# Patient Record
Sex: MALE | Race: WHITE
[De-identification: names, ages, dates, MRNs, and addresses within clinical notes are randomized per-mention and may not be internally consistent; named-entity substitution may affect disease eponyms.]

---

## 2022-07-09 ENCOUNTER — HOSPITAL ENCOUNTER (EMERGENCY)
Dept: HOSPITAL 33 - ED | Age: 62
Discharge: HOME | End: 2022-07-09
Payer: MEDICARE

## 2022-07-09 VITALS — DIASTOLIC BLOOD PRESSURE: 73 MMHG | HEART RATE: 76 BPM | SYSTOLIC BLOOD PRESSURE: 129 MMHG

## 2022-07-09 VITALS — OXYGEN SATURATION: 95 %

## 2022-07-09 DIAGNOSIS — Z72.0: ICD-10-CM

## 2022-07-09 DIAGNOSIS — E11.22: ICD-10-CM

## 2022-07-09 DIAGNOSIS — J44.9: ICD-10-CM

## 2022-07-09 DIAGNOSIS — M25.532: Primary | ICD-10-CM

## 2022-07-09 DIAGNOSIS — Z79.899: ICD-10-CM

## 2022-07-09 DIAGNOSIS — N18.9: ICD-10-CM

## 2022-07-09 PROCEDURE — 73110 X-RAY EXAM OF WRIST: CPT

## 2022-07-09 PROCEDURE — 99283 EMERGENCY DEPT VISIT LOW MDM: CPT

## 2022-07-09 NOTE — ERPHSYRPT
- History of Present Illness


Time Seen by Provider: 07/09/22 20:40


Source: patient


Exam Limitations: no limitations


Patient Subjective Stated Complaint: pt states he has left wrist pain, and is 

not aware of ever injuring the wrist. states the painbegan today, states he fell

out of his chair several times, states he could have hurt his wrist at that 

time. States he also hit his head at that time.


Triage Nursing Assessment: pt is alert and oriented, states pain in left wrist 

is 10/10. wrist does not appear swollen, but pt is gaurding wrist and states it 

hurts more if palpated.


Physician History: 





The patient is a 61-year-old male with past medical significant for COPD, 

ongoing cigarette smoking in which he smokes about a pack a day, diabetes 

mellitus, CKD who presents with a chief complaint of left wrist pain.


Of note, the patient is right-hand dominant.


He apparently started to experience left wrist pain today that has been constant

and gradually getting worse.


He states he has fallen out of a chair recently after falling asleep and may 

have injured his wrist during that time.


He is also had what was described to be a possible left cubital tunnel release a

year ago.


The pain is a throbbing pain that is constant and located to the left wrist.


He denies any additional injuries, fever, chills and has not take anything for 

pain prior to arrival.


He arrives accompanied by his mother.


The patient is retired and worked for lawn maintenance for living prior to 

long-term.


Allergies/Adverse Reactions: 








Penicillins Allergy (Severe, Verified 07/09/22 20:42)


   Swelling


ondansetron [From Zofran] Allergy (Intermediate, Verified 07/09/22 20:42)


   Headache


tramadol Adverse Reaction (Severe, Verified 07/09/22 20:42)


   hallucinate


codeine Adverse Reaction (Mild, Verified 07/09/22 20:42)


   abdominal pain





Home Medications: 








Atorvastatin Calcium 80 mg PO DAILY 06/25/19 [History]


Ergocalciferol (Vitamin D2) [Vitamin D] 50,000 unit PO WEEKLY 06/25/19 [History]


Gabapentin 400 mg PO HS 06/25/19 [History]


Linagliptin [Tradjenta] 5 mg PO DAILY 06/25/19 [History]


PANTOPRAZOLE 40 mg Tablet*** [Protonix 40MG Tablet***] 40 mg PO DAILY 06/25/19 

[History]


carvediloL [Carvedilol] 12.5 mg PO DAILY 06/25/19 [History]


Bumetanide 1 mg*** [Bumex 1 mg***] 1 mg PO DAILY 01/01/20 [History]


Cyclobenzaprine HCl [Flexeril] 10 mg PO TID 01/01/20 [History]


Empagliflozin [Jardiance] 10 mg PO DAILY 01/01/20 [History]


Linaclotide [Linzess] 72 mcg PO DAILY 01/01/20 [History]


Nitroglycerin 0.4 mg Tablet*** [Nitrostat 0.4 MG Tablet***] 0.4 mg SL UD 

01/01/20 [History]


Amlodipine Besylate 5 mg*** [Norvasc 5 mg***] 5 mg PO DAILY 08/20/21 [History]


Budesonide/Formoterol Fumarate [Budesonide-Formoterol 160-4.5] 2 puff PO BID 

08/20/21 [History]


Dulaglutide [Trulicity] 0.75 mg SQ WEEKLY 08/20/21 [History]


Evolocumab [Repatha Syringe] 140 mg IM WEEKLY 08/20/21 [History]


Famotidine 20 mg*** [Pepcid 20 MG***] 20 mg PO DAILY 08/20/21 [History]


Lipase/Protease/Amylase [Creon Dr 24,000 Unit Capsule] 2 cap PO TID 08/20/21 

[History]


Tiotropium Bromide Inhaler*** [Spiriva 18 Mcg/Cap Inhaler***] 18 mcg PO DAILY 

08/20/21 [History]





Hx Tetanus, Diphtheria Vaccination/Date Given: Yes


Hx Influenza Vaccination/Date Given: Yes


Hx Pneumococcal Vaccination/Date Given: Yes





Travel Risk





- International Travel


Have you traveled outside of the country in past 3 weeks: No





- Coronavirus Screening


Are you exhibiting any of the following symptoms?: No


Close contact with a COVID-19 positive Pt in past 14-21 Days: No





- Vaccine Status


Have you recieved a Covid-19 vaccination: Yes


: Pfizer





- Vaccination Dates


Date of 2cond Vaccination (if applicable): unknown





- Review of Systems


Constitutional: No Fever, No Chills


Respiratory: No Cough


Musculoskeletal: Fall, Other (Left wrist pain)


All Other Systems: Reviewed and Negative





- Past Medical History


Pertinent Past Medical History: Yes


Neurological History: No Pertinent History


ENT History: Cataracts


Cardiac History: Angina, Coronary Artery Disease, Other


Respiratory History: COPD


Endocrine Medical History: Diabetes Type II


Musculoskeletal History: No Pertinent History


GI Medical History: GERD


 History: No Pertinent History


Psycho-Social History: Anxiety, Depression


Male Reproductive Disorders: No Pertinent History


Other Medical History: 3 STENTS TO HEART AND RIGHT LEG, HAS HAD 2 HEART ATTACKS 

THAT WERE 2 YEARS APART





- Past Surgical History


Past Surgical History: Yes


Cardiac: Cardiac Catheterization, Cardiac Stent


Gastrointestinal: Cholecystectomy, Hernia Repair


Other Surgical History: windy.  hernia.  cardiac stents.  left elbow.  right 

elbow





- Social History


Smoking Status: Current every day smoker


How long have you smoked: 40 years


Exposure to second hand smoke: Yes


Drug Use: none


Patient Lives Alone: No


Significant Family History: no pertinent family hx





- Nursing Vital Signs


Nursing Vital Signs: 


                               Initial Vital Signs











Temperature  98.6 F   07/09/22 20:32


 


Respiratory Rate  18   07/09/22 20:32


 


O2 Sat by Pulse Oximetry  95   07/09/22 20:32








                                   Pain Scale











Pain Intensity                 3

















- Physical Exam


General Appearance: no apparent distress, alert


Eye Exam: No scleral icterus


Respiratory Exam: airway intact, diminished breath sounds, wheezing, No chest 

tenderness, No respiratory distress


Cardiovascular Exam: regular rate/rhythm, murmur, capillary refill 2-3 sec, 

other (Left radial pulse 2+, capillary refill brisk in all fingers of the left 

hand.)


Extremity Exam: tenderness, other (Patient had tenderness noted to the left 

wrist with no deformity crepitus and he had pain with passive and active motion 

of the left wrist in addition to the left fingers.  He did have some tenderness 

upon palpation of the left cubital tunnel however his elbow seem to be intact.  

Compartments soft)


Neurologic Exam: alert, oriented x 3


Skin Exam: normal color, warm, dry, No rash


SpO2: 95


O2 Delivery: Room Air





- Course


Nursing assessment & vital signs reviewed: Yes





- Radiology Exams


  ** Wrist


X-ray Interpretation: Interpreted by me, Reviewed by me, Negative, No Fracture


Ordered Tests: 


                               Active Orders 24 hr











 Category Date Time Status


 


 Ace Bandage Application -[facility] STAT Care  07/09/22 21:42 Completed


 


 Re-Check Vital Signs STAT Care  07/09/22 21:49 Completed


 


 WRIST (MIN 3 VIEWS) Stat Exams  07/09/22 20:55 Completed








Medication Summary














Discontinued Medications














Generic Name Dose Route Start Last Admin





  Trade Name Sonam  PRN Reason Stop Dose Admin


 


Hydrocodone Bitart/Acetaminophen  2 tab  07/09/22 21:00  07/09/22 21:06





  Hydrocodone/Apap 5/325 Mg Tablet  PO  07/09/22 21:01  2 tab





  STAT ONE   Administration


 


Hydrocodone Bitart/Acetaminophen  Confirm  07/09/22 21:03 





  Hydrocodone/Apap 5/325 Mg Tablet  Administered  07/09/22 21:04 





  Dose  





  2 tab  





  .ROUTE  





  .STK-MED ONE  














- Progress


Progress: improved


Progress Note: 





07/09/22 20:58


Inspect reviewed and appears the patient was prescribed 30 mg tablets of 

temazepam for 30-day supply that was filled on July 1, 2022.


Had also.  She is prescribed gabapentin 400 mg tablets that was filled on June 20, 2022.


07/09/22 21:22


Wrist x-ray reviewed and showed no evidence of acute fracture or dislocation.  

Currently waiting formal radiology review.


07/10/22 02:50


The patient presents with left wrist pain could be secondary to a fall and may 

be secondary to a sprain.  There is no evidence of fracture or dislocation.  I 

do not see any evidence of cellulitis and the joint itself is not warm in 

comparison to the right wrist.  There is no induration or fluctuance.  My 

suspicion for septic arthritis of the wrist, inflammatory joint disease is low 

at this time.  I provided the patient an Ace bandage for comfort and instructed 

to take Tylenol as needed for pain.  He was cautioned to return to the emergency

 department if you are developing fever, chills, increased redness swelling or 

increased warmth to the affected joint for further evaluation and management as 

needed.  Otherwise, I recommend he follow-up with his PCP for further 

evaluation.  He agreed with and verbally understood the discharge plan and was 

comfortable with being discharged home.


Counseled pt/family regarding: diagnosis, need for follow-up, rad results





- Departure


Departure Disposition: Home


Clinical Impression: 


 Wrist pain, left





Condition: Stable


Critical Care Time: No


Referrals: 


PETRA APODACA MD [Primary Care Provider] - Follow up/PCP as directed


Instructions:  Acetaminophen


Additional Instructions: 


Please take Tylenol as needed for pain.  You can purchase this medication 

over-the-counter.  Please take this medication as instructed on the medication 

bottle.





Please return to the emergency department for further evaluation if you notice 

any increased redness, swelling or fever associated with your pain.

## 2022-07-09 NOTE — XRAY
Indication: Pain.  No known injury.



Comparison: None



3 view left wrist demonstrates mild radiocarpal degenerative joint space

narrowing and diffuse scattered vascular calcifications.  No other bony,

articular, or soft tissue abnormalities.

## 2022-11-30 ENCOUNTER — HOSPITAL ENCOUNTER (EMERGENCY)
Dept: HOSPITAL 33 - ED | Age: 62
Discharge: HOME | End: 2022-11-30
Payer: MEDICARE

## 2022-11-30 VITALS — HEART RATE: 71 BPM | DIASTOLIC BLOOD PRESSURE: 66 MMHG | OXYGEN SATURATION: 94 % | SYSTOLIC BLOOD PRESSURE: 98 MMHG

## 2022-11-30 DIAGNOSIS — Z99.81: ICD-10-CM

## 2022-11-30 DIAGNOSIS — J44.9: ICD-10-CM

## 2022-11-30 DIAGNOSIS — E11.9: ICD-10-CM

## 2022-11-30 DIAGNOSIS — R05.1: ICD-10-CM

## 2022-11-30 DIAGNOSIS — J10.1: Primary | ICD-10-CM

## 2022-11-30 DIAGNOSIS — E78.5: ICD-10-CM

## 2022-11-30 DIAGNOSIS — Z79.84: ICD-10-CM

## 2022-11-30 DIAGNOSIS — Z79.899: ICD-10-CM

## 2022-11-30 DIAGNOSIS — M79.10: ICD-10-CM

## 2022-11-30 DIAGNOSIS — Z79.891: ICD-10-CM

## 2022-11-30 DIAGNOSIS — Z79.85: ICD-10-CM

## 2022-11-30 DIAGNOSIS — Z79.52: ICD-10-CM

## 2022-11-30 DIAGNOSIS — I10: ICD-10-CM

## 2022-11-30 LAB
FLUAV AG NPH QL IA: POSITIVE
FLUBV AG NPH QL IA: NEGATIVE
RSV AG SPEC QL IA: NEGATIVE
SARS-COV-2 AG RESP QL IA.RAPID: NEGATIVE

## 2022-11-30 PROCEDURE — 71045 X-RAY EXAM CHEST 1 VIEW: CPT

## 2022-11-30 PROCEDURE — 0241U: CPT

## 2022-11-30 PROCEDURE — 96372 THER/PROPH/DIAG INJ SC/IM: CPT

## 2022-11-30 PROCEDURE — 94640 AIRWAY INHALATION TREATMENT: CPT

## 2022-11-30 PROCEDURE — 99283 EMERGENCY DEPT VISIT LOW MDM: CPT

## 2022-11-30 NOTE — ERPHSYRPT
- History of Present Illness


Time Seen by Provider: 11/30/22 10:45


Source: patient


Exam Limitations: no limitations


Patient Subjective Stated Complaint: Weakness- Influenza A +


Triage Nursing Assessment: Patient ambulated back to ED and transferred self to 

bed. Patient A+O X 3. Patient's skin pink, warm and dry. Patient complains of 

weakness since yesterday. Patient dx with Influenza A on Monday. Patient noted 

to have O2 sat 86% on room air, but wears home O2 and didn't have with him. O2 2

liters applied with Sat to 98%. Patient complains of body aches 8/10. Patient 

complains of fever, cough, occasional SOB, and bodyaches since Sunday.


Physician History: 





This is a 61-year-old white male daily smoker of cigarettes with a history of 

hypertension, COPD, gastroesophageal reflux disease and hyperlipidemia who wears

2 L of oxygen via nasal cannula at home and was diagnosed 2 days ago with 

influenza A.  In the last 48 hours, he states that he aches all over, has 

intermittent coughing without nausea vomiting diarrhea or abdominal pain.  He 

has no chest pain.  He was placed on a Z-Miguel 2 days ago and continues to take 

that medication.  He has albuterol inhalers and nebulizer medication but has not

been consistent in taking those medications recently.  He says that he stopped 

smoking 2 days ago.  Patient has taken Norco/hydrocodone in the past without any

negative effects


Timing/Duration: day(s) (A few days), worse


Cough Quality/Degree: mild (To moderate and intermittent), dry cough


Possible Cause: occasional episodes


Modifying Factors: Improves With: coughing


Associated Symptoms: cough, muscle aches, shortness of breath, wheezing, No 

chest pain/soreness


Allergies/Adverse Reactions: 








Penicillins Allergy (Severe, Verified 11/30/22 10:45)


   Swelling


ondansetron [From Zofran] Allergy (Intermediate, Verified 11/30/22 10:45)


   Headache


tramadol Adverse Reaction (Severe, Verified 11/30/22 10:45)


   hallucinate


codeine Adverse Reaction (Mild, Verified 11/30/22 10:45)


   abdominal pain





Home Medications: 








Atorvastatin Calcium 80 mg PO DAILY 06/25/19 [History]


Ergocalciferol (Vitamin D2) [Vitamin D] 50,000 unit PO WEEKLY 06/25/19 [History]


Gabapentin 400 mg PO HS 06/25/19 [History]


Linagliptin [Tradjenta] 5 mg PO DAILY 06/25/19 [History]


PANTOPRAZOLE 40 mg Tablet*** [Protonix 40MG Tablet***] 40 mg PO DAILY 06/25/19 

[History]


carvediloL [Carvedilol] 12.5 mg PO DAILY 06/25/19 [History]


Bumetanide 1 mg*** [Bumex 1 mg***] 1 mg PO DAILY 01/01/20 [History]


Cyclobenzaprine HCl [Flexeril] 10 mg PO TID 01/01/20 [History]


Empagliflozin [Jardiance] 10 mg PO DAILY 01/01/20 [History]


Linaclotide [Linzess] 72 mcg PO DAILY 01/01/20 [History]


Nitroglycerin 0.4 mg Tablet*** [Nitrostat 0.4 MG Tablet***] 0.4 mg SL UD 

01/01/20 [History]


Amlodipine Besylate 5 mg*** [Norvasc 5 mg***] 5 mg PO DAILY 08/20/21 [History]


Budesonide/Formoterol Fumarate [Budesonide-Formoterol 160-4.5] 2 puff PO BID 

08/20/21 [History]


Dulaglutide [Trulicity] 0.75 mg SQ WEEKLY 08/20/21 [History]


Evolocumab [Repatha Syringe] 140 mg IM WEEKLY 08/20/21 [History]


Famotidine 20 mg*** [Pepcid 20 MG***] 20 mg PO DAILY 08/20/21 [History]


Lipase/Protease/Amylase [Creon Dr 24,000 Unit Capsule] 2 cap PO TID 08/20/21 

[History]


Tiotropium Bromide Inhaler*** [Spiriva 18 Mcg/Cap Inhaler***] 18 mcg PO DAILY 

08/20/21 [History]





Hx Tetanus, Diphtheria Vaccination/Date Given: Yes


Hx Influenza Vaccination/Date Given: Yes


Hx Pneumococcal Vaccination/Date Given: Yes


Immunizations Up to Date: Yes





Travel Risk





- International Travel


Have you traveled outside of the country in past 3 weeks: No





- Coronavirus Screening


Are you exhibiting any of the following symptoms?: Yes


Symptoms: Fever, Cough: New Onset, Shortness of Breath, Headaches/Body 

Aches/Fatigue


Close contact with a COVID-19 positive Pt in past 14-21 Days: No





- Vaccine Status


Have you recieved a Covid-19 vaccination: Yes


: Pfizer





- Vaccination Dates


Date of 2cond Vaccination (if applicable): unknown





- Review of Systems


Constitutional: No Symptoms


Eyes: No Symptoms


Ears, Nose, & Throat: No Symptoms


Respiratory: Cough, Wheezing


Cardiac: No Symptoms


Abdominal/Gastrointestinal: No Symptoms


Genitourinary Symptoms: No Symptoms


Musculoskeletal: Arthralgias, Myalgias


Skin: No Symptoms


Neurological: No Symptoms


Psychological: No Symptoms


Endocrine: No Symptoms


Hematologic/Lymphatic: No Symptoms


Immunological/Allergic: No Symptoms


All Other Systems: Reviewed and Negative





- Past Medical History


Pertinent Past Medical History: Yes


Neurological History: No Pertinent History


ENT History: Cataracts


Cardiac History: Angina, Coronary Artery Disease, Other


Respiratory History: COPD


Endocrine Medical History: Diabetes Type II


Musculoskeletal History: No Pertinent History


GI Medical History: GERD


 History: No Pertinent History


Psycho-Social History: Anxiety, Depression


Male Reproductive Disorders: No Pertinent History


Other Medical History: 3 STENTS TO HEART AND RIGHT LEG, HAS HAD 2 HEART ATTACKS 

THAT WERE 2 YEARS APART





- Past Surgical History


Past Surgical History: Yes


Cardiac: Cardiac Catheterization, Cardiac Stent


Gastrointestinal: Cholecystectomy, Hernia Repair


Other Surgical History: windy.  hernia.  cardiac stents.  left elbow.  right 

elbow





- Social History


Smoking Status: Former smoker


How long have you smoked: 40 years


Exposure to second hand smoke: Yes


Drug Use: none


Patient Lives Alone: No


Significant Family History: no pertinent family hx





- Nursing Vital Signs


Nursing Vital Signs: 


                               Initial Vital Signs











Temperature  97.9 F   11/30/22 10:39


 


Pulse Rate  81   11/30/22 10:39


 


Respiratory Rate  26 H  11/30/22 10:39


 


Blood Pressure  155/78   11/30/22 10:39


 


O2 Sat by Pulse Oximetry  86 L  11/30/22 10:39








                                   Pain Scale











Pain Intensity                 6

















- Physical Exam


General Appearance: no apparent distress, alert, anxiety


Eye Exam: PERRL/EOMI, eyes nml inspection


Ears, Nose, Throat Exam: normal ENT inspection, moist mucous membranes


Neck Exam: normal inspection, non-tender, supple, full range of motion


Respiratory Exam: wheezing, No chest tenderness, No respiratory distress


Cardiovascular Exam: regular rate/rhythm, normal heart sounds, normal peripheral

 pulses


Gastrointestinal/Abdomen Exam: soft, normal bowel sounds, No tenderness


Rectal Exam: not done


Back Exam: normal inspection, normal range of motion, No CVA tenderness, No 

vertebral tenderness


Extremity Exam: normal inspection, normal range of motion, pelvis stable


Neurologic Exam: alert, oriented x 3, cooperative, CNs II-XII nml as tested, 

normal mood/affect, nml cerebellar function, nml station & gait, sensation nml


Skin Exam: normal color, warm, dry


Lymphatic Exam: No adenopathy


**SpO2 Interpretation**: hypoxic


SpO2: 86 (Patient arrived without his oxygen.  Once we placed him on his usual 2

 L oxygen nasal cannula his oxygenation saturation increased to 98%)


O2 Delivery: Room Air





- Course


Nursing assessment & vital signs reviewed: Yes


Ordered Tests: 


                               Active Orders 24 hr











 Category Date Time Status


 


 CHEST 1 VIEW (PORTABLE) Stat Exams  11/30/22 10:49 Ordered


 


 Respiratory Therapy Assessment DAILY RT  11/30/22 11:16 Active








Medication Summary














Discontinued Medications














Generic Name Dose Route Start Last Admin





  Trade Name Freq  PRN Reason Stop Dose Admin


 


Hydrocodone Bitart/Acetaminophen  10 ml  11/30/22 10:56  11/30/22 11:23





  Hydrocodone/Acetaminophen 5 Ml Udcup  PO  11/30/22 10:57  10 ml





  STAT STA   Administration


 


Hydrocodone Bitart/Acetaminophen  Confirm  11/30/22 10:59 





  Hydrocodone/Acetaminophen 5 Ml Udcup  Administered  11/30/22 11:00 





  Dose  





  10 ml  





  .ROUTE  





  .STK-MED ONE  


 


Albuterol/Ipratropium  Confirm  11/30/22 11:06 





  Ipratropium/Albuterol Sulfate 3 Ml Ampul.Neb  Administered  11/30/22 11:07 





  Dose  





  3 ml  





  IH  





  .STK-MED ONE  


 


Albuterol/Ipratropium  3 ml  11/30/22 11:15  11/30/22 11:16





  Ipratropium/Albuterol Sulfate 3 Ml Ampul.Neb  IH  11/30/22 11:16  3 ml





  STAT ONE   Administration


 


Methylprednisolone Sodium  0 mg  11/30/22 10:56  11/30/22 11:20





Succinate 125 mg/ Sterile  IV  11/30/22 10:57  Not Given





Water 2 ml  STAT ONE  


 


Methylprednisolone Sodium  0 mg  11/30/22 11:06  11/30/22 11:23





Succinate 125 mg/ Sterile  IM  11/30/22 11:07  125 mg





Water 2 ml  STAT ONE   Administration


 


Methylprednisolone Sodium  0 mg  11/30/22 11:19  11/30/22 11:36





Succinate 125 mg/ Sterile  IM  11/30/22 11:20  Not Given





Water 2 ml  STAT ONE  


 


Lidocaine HCl  5 ml  11/30/22 12:37  11/30/22 12:38





  Lidocaine Hcl 1% 20 Ml Mdv*** 20 Ml Ml  IJ  11/30/22 12:38  Not Given





  STAT ONE  


 


Methylprednisolone Sodium Succinate  Confirm  11/30/22 10:59 





  Methylprednis Sod Succ 125 Mg/2 Ml Vial***  Administered  11/30/22 11:00 





  Dose  





  125 mg  





  .ROUTE  





  .STK-MED ONE  


 


Sterile Water  Confirm  11/30/22 10:59 





  Water For Injection,Sterile 10 Ml Vial  Administered  11/30/22 11:00 





  Dose  





  10 ml  





  IJ  





  .STK-MED ONE  











Lab/Rad Data: 


                               Laboratory Results











  11/30/22 Range/Units





  11:04 


 


Influenza Type A Ag  POSITIVE  (NEGATIVE)  


 


Influenza Type B Ag  NEGATIVE  (NEGATIVE)  


 


RSV (PCR)  NEGATIVE  (Negative)  


 


SARS-CoV-2 (PCR)  NEGATIVE  (NEGATIVE)  














- Progress


Progress: improved, re-examined


Air Movement: fair


Progress Note: 





11/30/22 11:53


Chest x-ray shows no acute cardiopulmonary disease


11/30/22 12:25


Clinically, the patient continues to say he has no chest pain.  His muscle aches

 and pains as well has his cough and shortness of breath have improved.  Patient

 states he is very comfortable.  We are awaiting the laboratory test results.


Blood Culture(s) Obtained: No


Antibiotics given: No


Counseled pt/family regarding: lab results, diagnosis, need for follow-up, rad 

results





- Departure


Departure Disposition: Home


Clinical Impression: 


 Influenza A H1N1 infection





Condition: Stable


Critical Care Time: No


Referrals: 


PETRA APODACA MD [Primary Care Provider] - Follow up/PCP as directed


Additional Instructions: 


Take your medications as prescribed.  Continue your antibiotics as prescribed. 

Wear your oxygen as prescribed.  Use your inhalers as prescribed.  For the next 

48 hours, use your albuterol nebulizer every 4 hours while awake.  Follow-up 

with your primary care provider for further evaluation and management.


Prescriptions: 


Hydrocodone/Acetaminophen [Hydrocodone-Acetamn 7.5-325/15] 10 ml PO Q8H PRN PRN 

#120 ml MDD 30 ml


 PRN Reason: Cough


Prednisone 10 mg*** [Deltasone 10 mg***] 10 mg PO TID #12 tablet

## 2022-11-30 NOTE — XRAY
Exam: AP upright portable chest film from 11/30/2022.



Comparison: Two-view chest from 01/02/2022.



Indication: 61-year-old male with cough/fatigue for 2 days.



Findings: Respiratory tubing overlies the left lung apex.  The lungs are again

noted to be mildly hyperinflated.  The transverse heart size is normal.

Vascular calcification of the aortic knob is seen.  The bruno and mediastinal

structures appear essentially unremarkable.  I cannot exclude a couple small

calcifications overlying the lower margin of the right hilum, although these

could represent vessels on end as well.



I see no air space infiltrates, vascular congestion, pneumothorax, or pleural

fluid.  There is minimal chronic blunting of the right lateral costophrenic

angle.  Mild degenerative changes are again seen within the lower thoracic

spine.



Impression:



1.  Mildly hyperinflated chest without evidence of new airspace infiltrate or

other acute cardiopulmonary disease.  This appears similar to the prior

two-view study of the chest from 01/02/2022.